# Patient Record
Sex: FEMALE | Race: BLACK OR AFRICAN AMERICAN | NOT HISPANIC OR LATINO | Employment: OTHER | URBAN - NONMETROPOLITAN AREA
[De-identification: names, ages, dates, MRNs, and addresses within clinical notes are randomized per-mention and may not be internally consistent; named-entity substitution may affect disease eponyms.]

---

## 2017-04-04 ENCOUNTER — OFFICE VISIT (OUTPATIENT)
Dept: SURGERY | Facility: CLINIC | Age: 72
End: 2017-04-04

## 2017-04-04 VITALS
DIASTOLIC BLOOD PRESSURE: 80 MMHG | BODY MASS INDEX: 28.86 KG/M2 | WEIGHT: 147 LBS | HEIGHT: 60 IN | SYSTOLIC BLOOD PRESSURE: 140 MMHG

## 2017-04-04 DIAGNOSIS — Z85.3 HX OF BREAST CANCER: Primary | ICD-10-CM

## 2017-04-04 PROCEDURE — 99214 OFFICE O/P EST MOD 30 MIN: CPT | Performed by: SURGERY

## 2017-04-09 NOTE — PROGRESS NOTES
Chief Complaint   Patient presents with   • Follow-up     Routine check on breast and mammograms.        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  She has subsequently completed radiation therapy to the LEFT breast in [place]..   She is also S/P RIGHT modified radical mastectomy for carcinoma in 1992 with postoperative chemotherapy. She had 6 of 47 positive lymph nodes, and was, therefore, staged a T2, N1, M0.    She has noted no new nodularity along any suture line, and no skin dimpling or nipple discharge on the LEFT side, and no axillary adenopathy on either side. Pain scale: 0 /10, Ambulation normal, Eating, Diet regular      [facility] [place]     Patient Name: EMMA MAJANO     ORDERING: IVANIA GALAN     ATTENDING: ADAL MONTANA MD FACS     REFERRING: IVANIA GALAN     -----------------------        PROCEDURE: Digital diagnostic mammogram of the left breast.     HISTORY: Left breast cancer, post lobectomy follow-up.     COMPARISON: Prior exams dating back to 11/5/2012, the most recent  dated 3/22/2016     NOTE:   Computer-aided detection was utilized during this exam.   Digital breast tomosynthesis was performed.     FINDINGS: CC, MLO, and spot compression cc views of the left  breast were obtained. There is architectural distortion and  nipple retraction related to previous lumpectomy.  Benign-appearing calcifications are noted in the outer upper left  breast near the skin. No suspicious mammographic findings are  seen.     IMPRESSION:  CONCLUSION:   1. RECOMMENDATIONS: Annual screening mammography unless clinical  circumstances dictate earlier evaluation     2. BI-RADS category 2, benign findings     Electronically signed by: James Cabrera MD 3/29/2017 9:39 AM CDT  Workstation: [id number]    ( I have personally reviewed the breast imaging and concur with the findings of the  radiologist- BiRADS 2)    Past Medical History:   Diagnosis Date   • Abnormal findings on diagnostic imaging of breast      LEFT side BIRADS Category 4 mammogram and ultrasound      • Benign lipomatous neoplasm of other sites    • Diverticular disease of colon    • History of colon polyps    • Hyperlipidemia    • Hypertensive disorder    • Intraductal carcinoma in situ of breast     L breast   • Malignant neoplasm of breast      T2N1M0 right side- 21 years S/P mastectomy          Past Surgical History:   Procedure Laterality Date   • BREAST BIOPSY  12/08/2014    Biopsy of the left breast with placement of a breast localization clip performed percutaneously using ultrasound guidance   • BREAST LUMPECTOMY     • BREAST SURGERY  12/18/2014    Ultrasound localization of left breast cance. Left lumpectomy   • COLONOSCOPY  07/19/2012    Mild diverticulosis (562.10) found in the sigmoid colon. A single polyp was found in the transverse colon(211.3); removed by cold biopsy polypectomy. Hemorrhoids found(455.6)   • MASTECTOMY  1992    modified radical         Current Outpatient Prescriptions:   •  amLODIPine (NORVASC) 5 MG tablet, Take 5 mg by mouth Daily., Disp: , Rfl:   •  anastrozole (ARIMIDEX) 1 MG tablet, Take 1 mg by mouth Daily., Disp: , Rfl:   •  aspirin 81 MG chewable tablet, Chew 81 mg Daily., Disp: , Rfl:   •  lisinopril-hydrochlorothiazide (PRINZIDE,ZESTORETIC) 10-12.5 MG per tablet, Take 1 tablet by mouth Daily., Disp: , Rfl:   •  metFORMIN (GLUCOPHAGE) 500 MG tablet, Take 500 mg by mouth Every Night., Disp: , Rfl:   •  simvastatin (ZOCOR) 20 MG tablet, Take 20 mg by mouth Every Night., Disp: , Rfl:     No Known Allergies    Family History   Problem Relation Age of Onset   • Family history unknown: Yes       Social History     Social History   • Marital status:      Spouse name: N/A   • Number of children: N/A   • Years of education: N/A     Occupational History   • Not on file.     Social History Main  Topics   • Smoking status: Never Smoker   • Smokeless tobacco: Not on file   • Alcohol use No   • Drug use: Not on file   • Sexual activity: Not on file     Other Topics Concern   • Not on file     Social History Narrative       Review of Systems   Constitutional: Negative for appetite change, chills, fever and unexpected weight change.   HENT: Negative for hearing loss, nosebleeds and trouble swallowing.    Eyes: Negative for visual disturbance.   Respiratory: Negative for apnea, cough, choking, chest tightness, shortness of breath, wheezing and stridor.    Cardiovascular: Negative for chest pain, palpitations and leg swelling.   Gastrointestinal: Negative for abdominal distention, abdominal pain, blood in stool, constipation, diarrhea, nausea and vomiting.   Endocrine: Negative for cold intolerance, heat intolerance, polydipsia, polyphagia and polyuria.   Genitourinary: Negative for difficulty urinating, dysuria, frequency, hematuria and urgency.   Musculoskeletal: Negative for arthralgias, back pain, myalgias and neck pain.   Skin: Negative for color change, pallor and rash.   Allergic/Immunologic: Negative for immunocompromised state.   Neurological: Negative for dizziness, seizures, syncope, light-headedness, numbness and headaches.   Hematological: Negative for adenopathy.   Psychiatric/Behavioral: Negative for suicidal ideas. The patient is not nervous/anxious.        Physical Exam   Constitutional: She appears well-developed and well-nourished. No distress.   HENT:   Head: Normocephalic and atraumatic.   Eyes: Conjunctivae and EOM are normal. Pupils are equal, round, and reactive to light.   Neck: Normal range of motion. Neck supple. No JVD present. No tracheal deviation present. No thyromegaly present.   Pulmonary/Chest: Effort normal and breath sounds normal. No stridor. No respiratory distress. She has no wheezes. She has no rales. She exhibits no tenderness. Left breast exhibits skin change (Minimal  radiation changes). Left breast exhibits no inverted nipple, no mass, no nipple discharge and no tenderness. There is no breast swelling.       Genitourinary: No breast discharge or bleeding.   Lymphadenopathy:     She has no cervical adenopathy.   Skin: She is not diaphoretic.   Vitals reviewed.        ASSESSMENT    Yaritza was seen today for follow-up.    Diagnoses and all orders for this visit:    Hx of breast cancer  Comments:  T2N1M0 RIGHT side 1992  [id number] LEFT side 2014      PLAN    1. Recheck in 1 year with mammograms and examination          This document has been electronically signed by Nathanael Ross MD on April 9, 2017 11:26 AM

## 2017-06-14 ENCOUNTER — OFFICE VISIT (OUTPATIENT)
Dept: GASTROENTEROLOGY | Facility: CLINIC | Age: 72
End: 2017-06-14

## 2017-06-14 VITALS
WEIGHT: 143.8 LBS | HEIGHT: 60 IN | BODY MASS INDEX: 28.23 KG/M2 | HEART RATE: 86 BPM | DIASTOLIC BLOOD PRESSURE: 89 MMHG | SYSTOLIC BLOOD PRESSURE: 148 MMHG

## 2017-06-14 DIAGNOSIS — K63.5 COLON POLYPS: Primary | ICD-10-CM

## 2017-06-14 PROCEDURE — PTNOCHG PR CUSTOM PT NO CHARGE VISIT: Performed by: INTERNAL MEDICINE

## 2017-06-14 RX ORDER — SODIUM CHLORIDE 0.9 % (FLUSH) 0.9 %
1-10 SYRINGE (ML) INJECTION AS NEEDED
Status: CANCELLED | OUTPATIENT
Start: 2017-06-14

## 2017-06-14 RX ORDER — DEXTROSE AND SODIUM CHLORIDE 5; .45 G/100ML; G/100ML
30 INJECTION, SOLUTION INTRAVENOUS CONTINUOUS PRN
Status: CANCELLED | OUTPATIENT
Start: 2017-06-14

## 2017-06-14 RX ORDER — LIDOCAINE HYDROCHLORIDE 10 MG/ML
0.1 INJECTION, SOLUTION EPIDURAL; INFILTRATION; INTRACAUDAL; PERINEURAL ONCE AS NEEDED
Status: CANCELLED | OUTPATIENT
Start: 2017-06-14

## 2017-06-18 NOTE — PROGRESS NOTES
Chief Complaint   Patient presents with   • Colonoscopy Consultation     Previous Colonoscopy Performed 07/19/2012        Yaritza De La O is a  71 y.o. female here for a follow up visit To arrange follow-up colonoscopy because of a history of colonic polyps.    HPI :  The patient was last seen here in 2012.  At that time repeat colonoscopy was advised in 5 years because of a history of colonic polyps.  Bowel movements are regular with no recent change in bowel habits.  She denies rectal bleeding or melena.  Family history is negative for carcinoma the colon.      Past Medical History:   Diagnosis Date   • Abnormal findings on diagnostic imaging of breast      LEFT side BIRADS Category 4 mammogram and ultrasound      • Benign lipomatous neoplasm of other sites    • Diverticular disease of colon    • History of colon polyps    • Hyperlipidemia    • Hypertensive disorder    • Intraductal carcinoma in situ of breast     L breast   • Malignant neoplasm of breast      T2N1M0 right side- 21 years S/P mastectomy          Current Outpatient Prescriptions   Medication Sig Dispense Refill   • amLODIPine (NORVASC) 5 MG tablet Take 5 mg by mouth Daily.     • anastrozole (ARIMIDEX) 1 MG tablet Take 1 mg by mouth Daily.     • aspirin 81 MG chewable tablet Chew 81 mg Daily.     • lisinopril-hydrochlorothiazide (PRINZIDE,ZESTORETIC) 10-12.5 MG per tablet Take 1 tablet by mouth Daily.     • metFORMIN (GLUCOPHAGE) 500 MG tablet Take 500 mg by mouth Every Night.     • simvastatin (ZOCOR) 20 MG tablet Take 20 mg by mouth Every Night.       No current facility-administered medications for this visit.        PRN Meds:.    No Known Allergies    Social History     Social History   • Marital status:      Spouse name: N/A   • Number of children: N/A   • Years of education: N/A     Occupational History   • Not on file.     Social History Main Topics   • Smoking status: Never Smoker   • Smokeless tobacco: Never Used   • Alcohol use No       Comment: Glass Of Wine At [place] And/Or New Years   • Drug use: No   • Sexual activity: Defer     Other Topics Concern   • Not on file     Social History Narrative       Family History   Problem Relation Age of Onset   • Family history unknown: Yes       Review of Systems   Constitutional: Negative for activity change, appetite change, chills, diaphoresis, fatigue, fever and unexpected weight change.   Gastrointestinal: Negative for abdominal distention, abdominal pain, anal bleeding, blood in stool, constipation, diarrhea, nausea, rectal pain and vomiting.   Psychiatric/Behavioral: Negative for confusion.       Vitals:    06/14/17 1121   BP: 148/89   Pulse: 86       Physical Exam   Constitutional: Vital signs are normal. She appears well-developed and well-nourished.  Non-toxic appearance. She does not have a sickly appearance. She does not appear ill. No distress.   Cardiovascular: Regular rhythm, S1 normal, S2 normal and normal heart sounds.   No extrasystoles are present. PMI is not displaced.    No murmur heard.  Pulmonary/Chest: Breath sounds normal. No tachypnea. No respiratory distress.   Abdominal: Soft. Normal appearance and bowel sounds are normal. She exhibits no shifting dullness, no distension, no fluid wave, no ascites and no mass. There is no hepatosplenomegaly, splenomegaly or hepatomegaly. There is no tenderness. No hernia.   Neurological: She is alert.   Nursing note and vitals reviewed.      ASSESSMENT AND PLAN      ICD-10-CM ICD-9-CM   1. Colon polyps K63.5 211.3      Plan :  Surveillance colonoscopy         This document has been electronically signed by Christopher Rodriguez MD on June 18, 2017 4:04 PM                                   “EMR Dragon/Transcription disclaimer:   Much of this encounter note is an electronic transcription/translation of spoken language to printed text. The electronic translation of spoken language may permit erroneous, or at times, nonsensical words or phrases  to be inadvertently transcribed; Although I have reviewed the note for such errors, some may still exist.”

## 2017-06-22 ENCOUNTER — HOSPITAL ENCOUNTER (OUTPATIENT)
Facility: HOSPITAL | Age: 72
Setting detail: HOSPITAL OUTPATIENT SURGERY
Discharge: HOME | End: 2017-06-22
Attending: INTERNAL MEDICINE | Admitting: INTERNAL MEDICINE

## 2017-06-22 ENCOUNTER — ANESTHESIA (OUTPATIENT)
Dept: GASTROENTEROLOGY | Facility: HOSPITAL | Age: 72
End: 2017-06-22

## 2017-06-22 ENCOUNTER — ANESTHESIA EVENT (OUTPATIENT)
Dept: GASTROENTEROLOGY | Facility: HOSPITAL | Age: 72
End: 2017-06-22

## 2017-06-22 VITALS
TEMPERATURE: 97.6 F | HEART RATE: 90 BPM | DIASTOLIC BLOOD PRESSURE: 73 MMHG | BODY MASS INDEX: 27.57 KG/M2 | WEIGHT: 140.43 LBS | RESPIRATION RATE: 18 BRPM | OXYGEN SATURATION: 96 % | HEIGHT: 60 IN | SYSTOLIC BLOOD PRESSURE: 127 MMHG

## 2017-06-22 DIAGNOSIS — K63.5 COLON POLYPS: ICD-10-CM

## 2017-06-22 LAB — GLUCOSE BLDC GLUCOMTR-MCNC: 116 MG/DL (ref 70–130)

## 2017-06-22 PROCEDURE — 45385 COLONOSCOPY W/LESION REMOVAL: CPT | Performed by: INTERNAL MEDICINE

## 2017-06-22 PROCEDURE — 25010000002 PROPOFOL 10 MG/ML EMULSION: Performed by: NURSE ANESTHETIST, CERTIFIED REGISTERED

## 2017-06-22 PROCEDURE — 88305 TISSUE EXAM BY PATHOLOGIST: CPT | Performed by: PATHOLOGY

## 2017-06-22 PROCEDURE — 82962 GLUCOSE BLOOD TEST: CPT

## 2017-06-22 PROCEDURE — 45380 COLONOSCOPY AND BIOPSY: CPT | Performed by: INTERNAL MEDICINE

## 2017-06-22 PROCEDURE — 88305 TISSUE EXAM BY PATHOLOGIST: CPT | Performed by: INTERNAL MEDICINE

## 2017-06-22 RX ORDER — PROPOFOL 10 MG/ML
VIAL (ML) INTRAVENOUS AS NEEDED
Status: DISCONTINUED | OUTPATIENT
Start: 2017-06-22 | End: 2017-06-22 | Stop reason: SURG

## 2017-06-22 RX ORDER — LIDOCAINE HYDROCHLORIDE 10 MG/ML
INJECTION, SOLUTION INFILTRATION; PERINEURAL AS NEEDED
Status: DISCONTINUED | OUTPATIENT
Start: 2017-06-22 | End: 2017-06-22 | Stop reason: SURG

## 2017-06-22 RX ORDER — DEXTROSE AND SODIUM CHLORIDE 5; .45 G/100ML; G/100ML
30 INJECTION, SOLUTION INTRAVENOUS CONTINUOUS PRN
Status: DISCONTINUED | OUTPATIENT
Start: 2017-06-22 | End: 2017-06-22 | Stop reason: HOSPADM

## 2017-06-22 RX ADMIN — DEXTROSE AND SODIUM CHLORIDE 30 ML/HR: 5; 450 INJECTION, SOLUTION INTRAVENOUS at 14:52

## 2017-06-22 RX ADMIN — PROPOFOL 30 MG: 10 INJECTION, EMULSION INTRAVENOUS at 15:40

## 2017-06-22 RX ADMIN — PROPOFOL 80 MG: 10 INJECTION, EMULSION INTRAVENOUS at 15:29

## 2017-06-22 RX ADMIN — PROPOFOL 50 MG: 10 INJECTION, EMULSION INTRAVENOUS at 15:32

## 2017-06-22 RX ADMIN — LIDOCAINE HYDROCHLORIDE 40 MG: 10 INJECTION, SOLUTION INFILTRATION; PERINEURAL at 15:29

## 2017-06-22 RX ADMIN — PROPOFOL 50 MG: 10 INJECTION, EMULSION INTRAVENOUS at 15:30

## 2017-06-22 RX ADMIN — PROPOFOL 50 MG: 10 INJECTION, EMULSION INTRAVENOUS at 15:35

## 2017-06-22 NOTE — H&P (VIEW-ONLY)
Chief Complaint   Patient presents with   • Colonoscopy Consultation     Previous Colonoscopy Performed 07/19/2012        Yaritza De La O is a  71 y.o. female here for a follow up visit To arrange follow-up colonoscopy because of a history of colonic polyps.    HPI :  The patient was last seen here in 2012.  At that time repeat colonoscopy was advised in 5 years because of a history of colonic polyps.  Bowel movements are regular with no recent change in bowel habits.  She denies rectal bleeding or melena.  Family history is negative for carcinoma the colon.      Past Medical History:   Diagnosis Date   • Abnormal findings on diagnostic imaging of breast      LEFT side BIRADS Category 4 mammogram and ultrasound      • Benign lipomatous neoplasm of other sites    • Diverticular disease of colon    • History of colon polyps    • Hyperlipidemia    • Hypertensive disorder    • Intraductal carcinoma in situ of breast     L breast   • Malignant neoplasm of breast      T2N1M0 right side- 21 years S/P mastectomy          Current Outpatient Prescriptions   Medication Sig Dispense Refill   • amLODIPine (NORVASC) 5 MG tablet Take 5 mg by mouth Daily.     • anastrozole (ARIMIDEX) 1 MG tablet Take 1 mg by mouth Daily.     • aspirin 81 MG chewable tablet Chew 81 mg Daily.     • lisinopril-hydrochlorothiazide (PRINZIDE,ZESTORETIC) 10-12.5 MG per tablet Take 1 tablet by mouth Daily.     • metFORMIN (GLUCOPHAGE) 500 MG tablet Take 500 mg by mouth Every Night.     • simvastatin (ZOCOR) 20 MG tablet Take 20 mg by mouth Every Night.       No current facility-administered medications for this visit.        PRN Meds:.    No Known Allergies    Social History     Social History   • Marital status:      Spouse name: N/A   • Number of children: N/A   • Years of education: N/A     Occupational History   • Not on file.     Social History Main Topics   • Smoking status: Never Smoker   • Smokeless tobacco: Never Used   • Alcohol use No       Comment: Glass Of Wine At [place] And/Or New Years   • Drug use: No   • Sexual activity: Defer     Other Topics Concern   • Not on file     Social History Narrative       Family History   Problem Relation Age of Onset   • Family history unknown: Yes       Review of Systems   Constitutional: Negative for activity change, appetite change, chills, diaphoresis, fatigue, fever and unexpected weight change.   Gastrointestinal: Negative for abdominal distention, abdominal pain, anal bleeding, blood in stool, constipation, diarrhea, nausea, rectal pain and vomiting.   Psychiatric/Behavioral: Negative for confusion.       Vitals:    06/14/17 1121   BP: 148/89   Pulse: 86       Physical Exam   Constitutional: Vital signs are normal. She appears well-developed and well-nourished.  Non-toxic appearance. She does not have a sickly appearance. She does not appear ill. No distress.   Cardiovascular: Regular rhythm, S1 normal, S2 normal and normal heart sounds.   No extrasystoles are present. PMI is not displaced.    No murmur heard.  Pulmonary/Chest: Breath sounds normal. No tachypnea. No respiratory distress.   Abdominal: Soft. Normal appearance and bowel sounds are normal. She exhibits no shifting dullness, no distension, no fluid wave, no ascites and no mass. There is no hepatosplenomegaly, splenomegaly or hepatomegaly. There is no tenderness. No hernia.   Neurological: She is alert.   Nursing note and vitals reviewed.      ASSESSMENT AND PLAN      ICD-10-CM ICD-9-CM   1. Colon polyps K63.5 211.3      Plan :  Surveillance colonoscopy         This document has been electronically signed by Christopher Rodriguez MD on June 18, 2017 4:04 PM                                   “EMR Dragon/Transcription disclaimer:   Much of this encounter note is an electronic transcription/translation of spoken language to printed text. The electronic translation of spoken language may permit erroneous, or at times, nonsensical words or phrases  to be inadvertently transcribed; Although I have reviewed the note for such errors, some may still exist.”

## 2017-06-22 NOTE — PLAN OF CARE
Problem: Patient Care Overview (Adult)  Goal: Plan of Care Review    06/22/17 1541   Coping/Psychosocial Response Interventions   Plan Of Care Reviewed With patient   Patient Care Overview   Progress no change   Outcome Evaluation   Outcome Summary/Follow up Plan vss         Problem: GI Endoscopy (Adult)  Goal: Signs and Symptoms of Listed Potential Problems Will be Absent or Manageable (GI Endoscopy)    06/22/17 1541   GI Endoscopy   Problems Assessed (GI Endoscopy) all   Problems Present (GI Endoscopy) none

## 2017-06-22 NOTE — PLAN OF CARE
Problem: Patient Care Overview (Adult)  Goal: Plan of Care Review    06/22/17 1607   Coping/Psychosocial Response Interventions   Plan Of Care Reviewed With patient   Patient Care Overview   Progress no change         Problem: GI Endoscopy (Adult)  Goal: Signs and Symptoms of Listed Potential Problems Will be Absent or Manageable (GI Endoscopy)    06/22/17 1607   GI Endoscopy   Problems Assessed (GI Endoscopy) all   Problems Present (GI Endoscopy) none

## 2017-06-22 NOTE — ANESTHESIA PREPROCEDURE EVALUATION
Anesthesia Evaluation     Patient summary reviewed and Nursing notes reviewed   NPO Solid Status: > 2 hours  NPO Liquid Status: > 2 hours     Airway   Mallampati: II  TM distance: >3 FB  Neck ROM: full  no difficulty expected  Dental - normal exam     Pulmonary - negative pulmonary ROS and normal exam   Cardiovascular     Rhythm: regular  Rate: normal        Neuro/Psych- negative ROS  GI/Hepatic/Renal/Endo      Musculoskeletal (-) negative ROS    Abdominal    Substance History      OB/GYN          Other                                      Anesthesia Plan    ASA 3     MAC     intravenous induction   Anesthetic plan and risks discussed with patient.    Plan discussed with CRNA.

## 2017-06-22 NOTE — INTERVAL H&P NOTE
H&P reviewed. The patient was examined and there are no changes to the H&P.  I have discussed risks and benefits of the proposed procedure. Patient wishes to proceed.

## 2017-06-22 NOTE — ANESTHESIA POSTPROCEDURE EVALUATION
Patient: Yaritza De La O    Procedure Summary     Date Anesthesia Start Anesthesia Stop Room / Location    06/22/17 1526 1549 [facility] ENDOSCOPY 1 / [facility] ENDOSCOPY       Procedure Diagnosis Surgeon Provider    COLONOSCOPY (N/A ) Colon polyps  (Colon polyps [K63.5]) MD Ubaldo Yusuf CRNA          Anesthesia Type: MAC  Last vitals  BP      Temp      Pulse     Resp      SpO2        Post Anesthesia Care and Evaluation    Patient location during evaluation: bedside  Patient participation: complete - patient participated  Level of consciousness: awake and alert  Pain score: 0  Pain management: adequate  Airway patency: patent  Anesthetic complications: No anesthetic complications  PONV Status: none  Cardiovascular status: acceptable  Respiratory status: acceptable  Hydration status: acceptable

## 2017-06-26 LAB
LAB AP CASE REPORT: NORMAL
Lab: NORMAL
PATH REPORT.FINAL DX SPEC: NORMAL
PATH REPORT.GROSS SPEC: NORMAL

## 2018-04-06 ENCOUNTER — OFFICE VISIT (OUTPATIENT)
Dept: SURGERY | Facility: CLINIC | Age: 73
End: 2018-04-06

## 2018-04-06 VITALS
HEIGHT: 60 IN | SYSTOLIC BLOOD PRESSURE: 122 MMHG | BODY MASS INDEX: 27.61 KG/M2 | DIASTOLIC BLOOD PRESSURE: 78 MMHG | WEIGHT: 140.6 LBS

## 2018-04-06 DIAGNOSIS — Z85.3 HISTORY OF BILATERAL BREAST CANCER: Primary | ICD-10-CM

## 2018-04-06 PROCEDURE — 99213 OFFICE O/P EST LOW 20 MIN: CPT | Performed by: SURGERY

## 2018-04-06 NOTE — PROGRESS NOTES
Chief Complaint   Patient presents with   • Follow-up     Recheck Breast and Mammogram.        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  She has subsequently completed radiation therapy to the LEFT breast in [place]..   She is also S/P RIGHT modified radical mastectomy for carcinoma in 1992 with postoperative chemotherapy. She had 6 of 47 positive lymph nodes, and was, therefore, staged a T2, N1, M0.    She has noted no new nodularity along any suture line, and no skin dimpling or nipple discharge on the LEFT side, and no axillary adenopathy on either side. Pain scale: 0 /10, Ambulation normal, Eating, Diet regular.  Most recent mammography demonstrates the following:  Study Result        PROCEDURE: Left unilateral screening mammogram with 3-D  tomosynthesis with CAD     REASON FOR EXAM: Screening mammography. History of right  mastectomy secondary to breast malignancy.     FINDINGS: Comparison study dated 3/29/2017, 3/22/2016, 9/15/2015,  11/18/2014.No interval change in appearance the breast  parenchyma. No suspicious mass or malignant type  microcalcifications . No skin thickening or retraction. Clip  marks site of prior left breast biopsy. Stable benign left breast  calcification.     Parenchymal pattern: Scattered fibroglandular densities     IMPRESSION:  No mammographic evidence of malignancy.     BI-RADS category 2: Benign findings.     Recommendation: Annual mammography      Electronically signed by:  Victor M Arriaga MD  4/2/2018 11:50 AM CDT  Workstation: [id number]        ( I have personally reviewed the breast imaging and concur with the findings of the radiologist- BiRADS 2)  Past Medical History:   Diagnosis Date   • Abnormal findings on diagnostic imaging of breast      LEFT side BIRADS Category 4 mammogram and ultrasound      • Benign lipomatous neoplasm of other sites    • Diabetes mellitus    •  Diverticular disease of colon    • History of colon polyps    • Hyperlipidemia    • Hypertensive disorder    • Intraductal carcinoma in situ of breast     L breast   • Malignant neoplasm of breast      T2N1M0 right side- 21 years S/P mastectomy          Past Surgical History:   Procedure Laterality Date   • BREAST BIOPSY  12/08/2014    Biopsy of the left breast with placement of a breast localization clip performed percutaneously using ultrasound guidance   • BREAST LUMPECTOMY     • BREAST SURGERY  12/18/2014    Ultrasound localization of left breast cance. Left lumpectomy   • COLONOSCOPY  07/19/2012    Mild diverticulosis (562.10) found in the sigmoid colon. A single polyp was found in the transverse colon(211.3); removed by cold biopsy polypectomy. Hemorrhoids found(455.6)   • COLONOSCOPY N/A 6/22/2017    Procedure: COLONOSCOPY;  Surgeon: Christopher Rodriguez MD;  Location: [facility] ENDOSCOPY;  Service:    • MASTECTOMY  1992    modified radical         Current Outpatient Prescriptions:   •  amLODIPine (NORVASC) 5 MG tablet, Take 5 mg by mouth Daily., Disp: , Rfl:   •  anastrozole (ARIMIDEX) 1 MG tablet, Take 1 mg by mouth Daily., Disp: , Rfl:   •  aspirin 81 MG chewable tablet, Chew 81 mg Daily., Disp: , Rfl:   •  lisinopril-hydrochlorothiazide (PRINZIDE,ZESTORETIC) 10-12.5 MG per tablet, Take 1 tablet by mouth Daily., Disp: , Rfl:   •  metFORMIN (GLUCOPHAGE) 500 MG tablet, Take 500 mg by mouth Every Night., Disp: , Rfl:   •  Multiple Vitamins-Minerals (MULTIVITAMIN PO), Take 1 tablet by mouth Daily., Disp: , Rfl:   •  simvastatin (ZOCOR) 20 MG tablet, Take 20 mg by mouth Every Night., Disp: , Rfl:     No Known Allergies    Family History   Problem Relation Age of Onset   • Breast cancer Sister        Social History     Social History   • Marital status:      Spouse name: N/A   • Number of children: N/A   • Years of education: N/A     Occupational History   • Not on file.     Social History Main Topics   •  Smoking status: Never Smoker   • Smokeless tobacco: Never Used   • Alcohol use No      Comment: Glass Of Wine At [place] And/Or New Years   • Drug use: No   • Sexual activity: Defer     Other Topics Concern   • Not on file     Social History Narrative   • No narrative on file       Review of Systems   Constitutional: Negative for appetite change, chills, fever and unexpected weight change.   HENT: Negative for hearing loss, nosebleeds and trouble swallowing.    Eyes: Negative for visual disturbance.   Respiratory: Negative for apnea, cough, choking, chest tightness, shortness of breath, wheezing and stridor.    Cardiovascular: Negative for chest pain, palpitations and leg swelling.   Gastrointestinal: Negative for abdominal distention, abdominal pain, blood in stool, constipation, diarrhea, nausea and vomiting.   Endocrine: Negative for cold intolerance, heat intolerance, polydipsia, polyphagia and polyuria.   Genitourinary: Negative for difficulty urinating, dysuria, frequency, hematuria and urgency.   Musculoskeletal: Negative for arthralgias, back pain, myalgias and neck pain.   Skin: Negative for color change, pallor and rash.   Allergic/Immunologic: Negative for immunocompromised state.   Neurological: Negative for dizziness, seizures, syncope, light-headedness, numbness and headaches.   Hematological: Negative for adenopathy.   Psychiatric/Behavioral: Negative for suicidal ideas. The patient is not nervous/anxious.        Physical Exam      Constitutional: She appears well-developed and well-nourished. No distress.   HENT:   Head: Normocephalic and atraumatic.   Eyes: Conjunctivae and EOM are normal. Pupils are equal, round, and reactive to light.   Neck: Normal range of motion. Neck supple. No JVD present. No tracheal deviation present. No thyromegaly present.   Pulmonary/Chest: Effort normal and breath sounds normal. No stridor. No respiratory distress. She has no wheezes. She has no rales. She exhibits no  tenderness. Left breast exhibits skin change (Minimal radiation changes). Left breast exhibits no inverted nipple, no mass, no nipple discharge and no tenderness. There is no breast swelling.       Genitourinary: No breast discharge or bleeding.   Lymphadenopathy: She has no cervical adenopathy and no supraclavicular adenopathy.   Skin: She is not diaphoretic.   Vitals reviewed.  ASSESSMENT    Yaritza was seen today for follow-up.    Diagnoses and all orders for this visit:    History of bilateral breast cancer        PLAN    1.  Recheck in 1 year with mammogram left side and examination at that time          This document has been electronically signed by Nathanael Ross MD on April 8, 2018 4:38 PM

## 2018-04-06 NOTE — PATIENT INSTRUCTIONS

## 2018-04-11 DIAGNOSIS — Z12.31 SCREENING MAMMOGRAM, ENCOUNTER FOR: Primary | ICD-10-CM

## 2019-04-08 ENCOUNTER — OFFICE VISIT (OUTPATIENT)
Dept: SURGERY | Facility: CLINIC | Age: 74
End: 2019-04-08

## 2019-04-08 VITALS
HEIGHT: 60 IN | WEIGHT: 139 LBS | DIASTOLIC BLOOD PRESSURE: 80 MMHG | TEMPERATURE: 98 F | BODY MASS INDEX: 27.29 KG/M2 | SYSTOLIC BLOOD PRESSURE: 120 MMHG | HEART RATE: 83 BPM

## 2019-04-08 DIAGNOSIS — Z85.3 HX: BREAST CANCER: Primary | ICD-10-CM

## 2019-04-08 PROCEDURE — 99213 OFFICE O/P EST LOW 20 MIN: CPT | Performed by: SURGERY

## 2019-04-08 NOTE — PATIENT INSTRUCTIONS

## 2019-04-11 PROBLEM — Z85.3 HX: BREAST CANCER: Status: ACTIVE | Noted: 2019-04-11

## 2019-04-12 NOTE — PROGRESS NOTES
Chief Complaint: This is a 73 y.o. woman seen in consultation for routine followup breast cancer    History of Present Illness:  Patient has noted no new masses, skin changes, nipple discharge, nipple changes prior to her most recent imaging.  Her most recent imaging includes the following:   Study Result     PROCEDURE: Unilateral left digital screening mammogram      HISTORY: Routine screening mammography. Patient has undergone  right mastectomy for breast cancer.     COMPARISON: Prior exams dated 3/30/2018 through 11/13/2014     NOTE: Computer-aided detection was utilized during this exam.   Digital breast tomosynthesis was performed.     FINDINGS:   CC and MLO views are obtained of the left breast.      Parenchymal pattern: Heterogeneous     Stable benign-appearing calcifications and postoperative changes  are noted.  No suspicious mass, architectural distortion or  suspicious microcalcifications.     IMPRESSION:  CONCLUSION:    No mammographic evidence of malignancy.  In the absence of  suspicious clinical or physical findings, routine follow-up  mammography is recommended in one year.     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  She has subsequently completed radiation therapy to the LEFT breast in [place]..   She is also S/P RIGHT modified radical mastectomy for carcinoma in 1992 with postoperative chemotherapy. She had 6 of 47 positive lymph nodes, and was, therefore, staged a T2, N1, M0.    She has noted no new nodularity along any suture line, and no skin dimpling or nipple discharge on the LEFT side, and no axillary adenopathy on either side.  Pain scale: 0 /10, Ambulation normal, Eating, Diet regular.  Her family history includes the following: Sister with breast cancer    She is here for evaluation.    Past Medical History:   Diagnosis Date   • Abnormal findings on diagnostic imaging of breast      LEFT side BIRADS Category 4 mammogram and ultrasound      • Benign lipomatous neoplasm of other sites    • Diabetes mellitus (CMS/HCC)    • Diverticular disease of colon    • Drug therapy    • History of colon polyps    • Hx of radiation therapy    • Hyperlipidemia    • Hypertensive disorder    • Intraductal carcinoma in situ of breast     L breast   • Malignant neoplasm of breast (CMS/HCC)      T2N1M0 right side- 21 years S/P mastectomy        Past Surgical History:   Procedure Laterality Date   • BREAST BIOPSY  12/08/2014    Biopsy of the left breast with placement of a breast localization clip performed percutaneously using ultrasound guidance   • BREAST LUMPECTOMY     • BREAST SURGERY  12/18/2014    Ultrasound localization of left breast cance. Left lumpectomy   • COLONOSCOPY  07/19/2012    Mild diverticulosis (562.10) found in the sigmoid colon. A single polyp was found in the transverse colon(211.3); removed by cold biopsy polypectomy. Hemorrhoids found(455.6)   • COLONOSCOPY N/A 6/22/2017    Procedure: COLONOSCOPY;  Surgeon: Christopher Rodriguez MD;  Location: [facility] [place] ENDOSCOPY;  Service:    • MASTECTOMY  1992    modified radical     Prior to Admission medications    Medication Sig Start Date End Date Taking? Authorizing Provider   amLODIPine (NORVASC) 5 MG tablet Take 5 mg by mouth Daily.   Yes ProviderCarolin MD   anastrozole (ARIMIDEX) 1 MG tablet Take 1 mg by mouth Daily.   Yes ProviderCarolin MD   aspirin 81 MG chewable tablet Chew 81 mg Daily.   Yes ProviderCarolin MD   lisinopril-hydrochlorothiazide (PRINZIDE,ZESTORETIC) 10-12.5 MG per tablet Take 1 tablet by mouth Daily.   Yes Carolin Negron MD   metFORMIN (GLUCOPHAGE)  500 MG tablet Take 500 mg by mouth Every Night.   Yes ProviderCarolin MD   Multiple Vitamins-Minerals (MULTIVITAMIN PO) Take 1 tablet by mouth Daily.   Yes Carolin Negron MD   simvastatin (ZOCOR) 20 MG tablet Take 20 mg by mouth Every Night.   Yes Carolin Negron MD     No Known Allergies  Family History   Problem Relation Age of Onset   • Breast cancer Sister      Social History     Socioeconomic History   • Marital status:      Spouse name: Not on file   • Number of children: Not on file   • Years of education: Not on file   • Highest education level: Not on file   Tobacco Use   • Smoking status: Never Smoker   • Smokeless tobacco: Never Used   Substance and Sexual Activity   • Alcohol use: No     Comment: Glass Of Wine At [place] And/Or New Years   • Drug use: No   • Sexual activity: Defer     Review of Systems   Constitutional: Negative for appetite change, chills, fever and unexpected weight change.   HENT: Negative for hearing loss, nosebleeds and trouble swallowing.    Eyes: Negative for visual disturbance.   Respiratory: Negative for apnea, cough, choking, chest tightness, shortness of breath, wheezing and stridor.    Cardiovascular: Negative for chest pain, palpitations and leg swelling.   Gastrointestinal: Negative for abdominal distention, abdominal pain, blood in stool, constipation, diarrhea, nausea and vomiting.   Endocrine: Negative for cold intolerance, heat intolerance, polydipsia, polyphagia and polyuria.   Genitourinary: Negative for difficulty urinating, dysuria, frequency, hematuria and urgency.   Musculoskeletal: Negative for arthralgias, back pain, myalgias and neck pain.   Skin: Negative for color change, pallor and rash.   Allergic/Immunologic: Negative for immunocompromised state.   Neurological: Negative for dizziness, seizures, syncope, light-headedness, numbness and headaches.   Hematological: Negative for adenopathy.   Psychiatric/Behavioral: Negative for  suicidal ideas. The patient is not nervous/anxious.      Physical Exam   Constitutional: She is oriented to person, place, and time. She appears well-developed and well-nourished. No distress.   HENT:   Head: Normocephalic and atraumatic.   Eyes: EOM are normal. Pupils are equal, round, and reactive to light.   Neck: Normal range of motion. Neck supple. No tracheal deviation present. No thyromegaly present.   Cardiovascular: Normal rate and regular rhythm.   Pulmonary/Chest: Effort normal and breath sounds normal. No stridor. No respiratory distress. She has no wheezes. She has no rales. She exhibits no tenderness. Left breast exhibits no inverted nipple, no mass, no nipple discharge, no skin change and no tenderness.       Abdominal: Soft. Normal appearance. There is no tenderness.   All incisions are healing well without infection or hernia.    Musculoskeletal: Normal range of motion. She exhibits no edema or deformity.   Neurological: She is alert and oriented to person, place, and time.   Skin: Skin is warm and dry. She is not diaphoretic. There is erythema. There is pallor.   Vitals reviewed.    Vitals:    04/08/19 0907   BP: 120/80   Pulse: 83   Temp: 98 °F (36.7 °C)     Assessment:    Yaritza was seen today for follow-up.    Diagnoses and all orders for this visit:    HX: breast cancer        Plan:  1. Recheck in 1 year with mammogram and examination                This document has been electronically signed by Nathanael Ross MD on April 11, 2019 10:27 PM

## 2020-04-27 DIAGNOSIS — Z12.31 SCREENING MAMMOGRAM, ENCOUNTER FOR: Primary | ICD-10-CM

## 2020-06-22 ENCOUNTER — OFFICE VISIT (OUTPATIENT)
Dept: SURGERY | Facility: CLINIC | Age: 75
End: 2020-06-22

## 2020-06-22 VITALS
WEIGHT: 134.6 LBS | DIASTOLIC BLOOD PRESSURE: 80 MMHG | BODY MASS INDEX: 26.42 KG/M2 | TEMPERATURE: 98.7 F | SYSTOLIC BLOOD PRESSURE: 128 MMHG | HEIGHT: 60 IN | OXYGEN SATURATION: 98 % | HEART RATE: 90 BPM

## 2020-06-22 DIAGNOSIS — Z85.3 HX: BREAST CANCER: Primary | ICD-10-CM

## 2020-06-22 PROCEDURE — 99212 OFFICE O/P EST SF 10 MIN: CPT | Performed by: SURGERY

## 2020-06-22 RX ORDER — ATORVASTATIN CALCIUM 40 MG/1
40 TABLET, FILM COATED ORAL NIGHTLY
COMMUNITY
Start: 2020-06-12

## 2020-06-22 NOTE — PROGRESS NOTES
Chief Complaint   Patient presents with   • Follow-up     Breast and Mammogram        HPI  Hx of breast cancer 28 years ago (invasive- mastectomy); and DCIS in 2006 on the left side- lumpectomy and RT. No new masses or changes.    Study Result     MAMMOGRAM: Screening left breast.     HISTORY: Screening mammogram.       Prior history right breast cancer, and mastectomy.     COMPARISON:  April 8, 2019.        FINDINGS:     Left breast low dose digital mammography was performed.    This study was interpreted with the assistance of CAD (computer  aided diagnosis. 3-D Mammotomosynthesis was obtained.     Parenchymal pattern: Heterogenous dense.        There is no evidence of a newly appearing discrete mass lesion,  area of architectural distortion, or abnormal microcalcifications  to suspect the presence of malignancy.  Scarring and nipple retraction left breast, unchanged since prior  exams. Benign mammographic exam.     IMPRESSION:  1.  No mammographic evidence of malignancy - no interval change.     Follow-up recommendations:  annual mammographic surveillance.  BIRADS: 2, Benign finding(s)        Electronically signed by:  Rj Calvillo MD  6/15/2020 11:41 AM CDT  Workstation: [id number]     ( I have personally reviewed the breast imaging and concur with the findings of the radiologist- BiRADS 2)  Past Medical History:   Diagnosis Date   • Abnormal findings on diagnostic imaging of breast      LEFT side BIRADS Category 4 mammogram and ultrasound      • Benign lipomatous neoplasm of other sites    • Diabetes mellitus (CMS/HCC)    • Diverticular disease of colon    • Drug therapy    • History of colon polyps    • Hx of radiation therapy    • Hyperlipidemia    • Hypertensive disorder    • Intraductal carcinoma in situ of breast     L breast   • Malignant neoplasm of breast (CMS/HCC)      T2N1M0 right side- 21 years S/P mastectomy          Past Surgical History:   Procedure Laterality Date   • BREAST BIOPSY  12/08/2014     Biopsy of the left breast with placement of a breast localization clip performed percutaneously using ultrasound guidance   • BREAST LUMPECTOMY     • BREAST SURGERY  12/18/2014    Ultrasound localization of left breast cance. Left lumpectomy   • COLONOSCOPY  07/19/2012    Mild diverticulosis (562.10) found in the sigmoid colon. A single polyp was found in the transverse colon(211.3); removed by cold biopsy polypectomy. Hemorrhoids found(455.6)   • COLONOSCOPY N/A 6/22/2017    Procedure: COLONOSCOPY;  Surgeon: Christopher Rodriguez MD;  Location: [facility] ENDOSCOPY;  Service:    • MASTECTOMY  1992    modified radical         Current Outpatient Medications:   •  amLODIPine (NORVASC) 5 MG tablet, Take 5 mg by mouth Daily., Disp: , Rfl:   •  anastrozole (ARIMIDEX) 1 MG tablet, Take 1 mg by mouth Daily., Disp: , Rfl:   •  aspirin 81 MG chewable tablet, Chew 81 mg Daily., Disp: , Rfl:   •  atorvastatin (LIPITOR) 40 MG tablet, , Disp: , Rfl:   •  lisinopril-hydrochlorothiazide (PRINZIDE,ZESTORETIC) 10-12.5 MG per tablet, Take 1 tablet by mouth Daily., Disp: , Rfl:   •  metFORMIN (GLUCOPHAGE) 500 MG tablet, Take 500 mg by mouth Every Night., Disp: , Rfl:   •  Multiple Vitamins-Minerals (MULTIVITAMIN PO), Take 1 tablet by mouth Daily., Disp: , Rfl:   •  simvastatin (ZOCOR) 20 MG tablet, Take 20 mg by mouth Every Night., Disp: , Rfl:     No Known Allergies    Family History   Problem Relation Age of Onset   • Breast cancer Sister        Social History     Socioeconomic History   • Marital status:      Spouse name: Not on file   • Number of children: Not on file   • Years of education: Not on file   • Highest education level: Not on file   Tobacco Use   • Smoking status: Never Smoker   • Smokeless tobacco: Never Used   Substance and Sexual Activity   • Alcohol use: No     Comment: Glass Of Wine At [place] And/Or New Years   • Drug use: No   • Sexual activity: Defer           Physical Exam   Pulmonary/Chest: Left breast  exhibits skin change. Left breast exhibits no inverted nipple, no mass, no nipple discharge and no tenderness.             ASSESSMENT    Yaritza was seen today for follow-up.    Diagnoses and all orders for this visit:    HX: breast cancer        PLAN    1. Recheck in 1 year with mammogram and exam              This document has been electronically signed by Nathanael Ross MD on June 22, 2020 10:34

## 2020-07-16 DIAGNOSIS — Z12.31 SCREENING MAMMOGRAM FOR HIGH-RISK PATIENT: Primary | ICD-10-CM

## 2021-06-24 DIAGNOSIS — Z12.39 SCREENING BREAST EXAMINATION: Primary | ICD-10-CM

## 2021-06-30 ENCOUNTER — OFFICE VISIT (OUTPATIENT)
Dept: SURGERY | Facility: CLINIC | Age: 76
End: 2021-06-30

## 2021-06-30 VITALS
HEART RATE: 88 BPM | TEMPERATURE: 97.3 F | WEIGHT: 131 LBS | BODY MASS INDEX: 25.72 KG/M2 | OXYGEN SATURATION: 95 % | HEIGHT: 60 IN

## 2021-06-30 DIAGNOSIS — Z12.31 SCREENING MAMMOGRAM FOR HIGH-RISK PATIENT: ICD-10-CM

## 2021-06-30 DIAGNOSIS — Z12.39 SCREENING BREAST EXAMINATION: Primary | ICD-10-CM

## 2021-06-30 DIAGNOSIS — Z85.3 HX: BREAST CANCER: Primary | ICD-10-CM

## 2021-06-30 PROCEDURE — 99212 OFFICE O/P EST SF 10 MIN: CPT | Performed by: SURGERY

## 2021-06-30 RX ORDER — CETIRIZINE HYDROCHLORIDE 10 MG/1
10 TABLET ORAL DAILY
COMMUNITY

## 2021-06-30 RX ORDER — BLOOD-GLUCOSE METER
1 KIT MISCELLANEOUS 2 TIMES DAILY
COMMUNITY
Start: 2021-04-27

## 2021-07-06 NOTE — PROGRESS NOTES
Chief Complaint   Patient presents with   • Follow-up       Breast and Mammogram         HPI  75 year old woman hx of breast cancer 29 years ago (invasive- mastectomy); and DCIS in 2006 on the left side- lumpectomy and RT. No new masses or changes.     Study Result    Study Result    Narrative & Impression      PROCEDURE: Left unilateral screening mammogram with 3-D  tomosynthesis with CAD     REASON FOR EXAM: Screening mammography. Patient with history of  right breast carcinoma with mastectomy. Patient with history of  left breast carcinoma with lumpectomy.     FINDINGS: Comparison study dated 6/15/2020, 4/8/2019, 3/30/2018,  3/29/2017, 3/22/2016, and 9/15/2015.Stable left breast post  lumpectomy changes unchanged. No suspicious mass or malignant  type microcalcifications. Stable benign left breast  calcifications.     Parenchymal pattern: Scattered fibroglandular densities     IMPRESSION:  No mammographic evidence of malignancy.     BI-RADS category 2: Benign findings.     Recommendation: Annual mammography      Electronically signed by:  Victor M Arriaga MD  6/25/2021 12:19 PM CDT  Workstation: [id number]        ( I have personally reviewed the breast imaging and concur with the findings of the radiologist- BiRADS 2)  Medical History.        Past Medical History:   Diagnosis Date   • Abnormal findings on diagnostic imaging of breast        LEFT side BIRADS Category 4 mammogram and ultrasound      • Benign lipomatous neoplasm of other sites     • Diabetes mellitus (CMS/HCC)     • Diverticular disease of colon     • Drug therapy     • History of colon polyps     • Hx of radiation therapy     • Hyperlipidemia     • Hypertensive disorder     • Intraductal carcinoma in situ of breast       L breast   • Malignant neoplasm of breast (CMS/HCC)        T2N1M0 right side- 21 years S/P mastectomy               Surgical History         Past Surgical History:   Procedure Laterality Date   • BREAST BIOPSY   12/08/2014     Biopsy  of the left breast with placement of a breast localization clip performed percutaneously using ultrasound guidance   • BREAST LUMPECTOMY       • BREAST SURGERY   12/18/2014     Ultrasound localization of left breast cance. Left lumpectomy   • COLONOSCOPY   07/19/2012     Mild diverticulosis (562.10) found in the sigmoid colon. A single polyp was found in the transverse colon(211.3); removed by cold biopsy polypectomy. Hemorrhoids found(455.6)   • COLONOSCOPY N/A 6/22/2017     Procedure: COLONOSCOPY;  Surgeon: Christopher Rodriguez MD;  Location: [facility] ENDOSCOPY;  Service:    • MASTECTOMY   1992     modified radical               Current Outpatient Medications:   •  amLODIPine (NORVASC) 5 MG tablet, Take 5 mg by mouth Daily., Disp: , Rfl:   •  anastrozole (ARIMIDEX) 1 MG tablet, Take 1 mg by mouth Daily., Disp: , Rfl:   •  aspirin 81 MG chewable tablet, Chew 81 mg Daily., Disp: , Rfl:   •  atorvastatin (LIPITOR) 40 MG tablet, , Disp: , Rfl:   •  lisinopril-hydrochlorothiazide (PRINZIDE,ZESTORETIC) 10-12.5 MG per tablet, Take 1 tablet by mouth Daily., Disp: , Rfl:   •  metFORMIN (GLUCOPHAGE) 500 MG tablet, Take 500 mg by mouth Every Night., Disp: , Rfl:   •  Multiple Vitamins-Minerals (MULTIVITAMIN PO), Take 1 tablet by mouth Daily., Disp: , Rfl:   •  simvastatin (ZOCOR) 20 MG tablet, Take 20 mg by mouth Every Night., Disp: , Rfl:      No Known Allergies           Family History   Problem Relation Age of Onset   • Breast cancer Sister           Social History   Social History            Socioeconomic History   • Marital status:        Spouse name: Not on file   • Number of children: Not on file   • Years of education: Not on file   • Highest education level: Not on file   Tobacco Use   • Smoking status: Never Smoker   • Smokeless tobacco: Never Used   Substance and Sexual Activity   • Alcohol use: No       Comment: Glass Of Wine At [place] And/Or New Years   • Drug use: No   • Sexual activity: Defer                   Physical Exam   Pulmonary/Chest: Left breast exhibits skin change. Left breast exhibits no inverted nipple, no mass, no nipple discharge and no tenderness.                ASSESSMENT     Yaritza was seen today for follow-up.     Diagnoses and all orders for this visit:     HX: breast cancer           PLAN     1. Recheck in 1 year with mammogram and exam

## 2022-05-18 ENCOUNTER — OFFICE VISIT (OUTPATIENT)
Dept: GASTROENTEROLOGY | Facility: CLINIC | Age: 77
End: 2022-05-18

## 2022-05-18 VITALS
HEIGHT: 60 IN | WEIGHT: 130 LBS | SYSTOLIC BLOOD PRESSURE: 141 MMHG | BODY MASS INDEX: 25.52 KG/M2 | HEART RATE: 87 BPM | DIASTOLIC BLOOD PRESSURE: 73 MMHG

## 2022-05-18 DIAGNOSIS — Z12.11 ENCOUNTER FOR COLONOSCOPY DUE TO HISTORY OF ADENOMATOUS COLONIC POLYPS: Primary | ICD-10-CM

## 2022-05-18 DIAGNOSIS — Z86.010 ENCOUNTER FOR COLONOSCOPY DUE TO HISTORY OF ADENOMATOUS COLONIC POLYPS: Primary | ICD-10-CM

## 2022-05-18 PROBLEM — I10 HYPERTENSIVE DISORDER: Status: ACTIVE | Noted: 2020-01-02

## 2022-05-18 PROCEDURE — S0260 H&P FOR SURGERY: HCPCS | Performed by: NURSE PRACTITIONER

## 2022-05-18 RX ORDER — TRAZODONE HYDROCHLORIDE 50 MG/1
50 TABLET ORAL NIGHTLY PRN
COMMUNITY
Start: 2022-04-27

## 2022-05-18 RX ORDER — SODIUM, POTASSIUM,MAG SULFATES 17.5-3.13G
1 SOLUTION, RECONSTITUTED, ORAL ORAL EVERY 12 HOURS
Qty: 354 ML | Refills: 0 | Status: SHIPPED | OUTPATIENT
Start: 2022-05-18 | End: 2022-06-10

## 2022-05-18 RX ORDER — DEXTROSE AND SODIUM CHLORIDE 5; .45 G/100ML; G/100ML
30 INJECTION, SOLUTION INTRAVENOUS CONTINUOUS PRN
Status: CANCELLED | OUTPATIENT
Start: 2022-06-14

## 2022-05-18 NOTE — PROGRESS NOTES
Chief Complaint   Patient presents with   • Colon Cancer Screening       Subjective    Yaritza De La O is a 76 y.o. female. she is being seen for consultation today at the request of Dr. Parada    History of Present Illness  76-year-old female presents to discuss screening colonoscopy.  She denies any abdominal pain change in bowel movements or blood within her stool.  She has personal history of colonic polyps most recent colonoscopy June 2017 noted adenomatous colonic polyp removed from cecum diverticulosis.  Repeat recommended in 5 years for surveillance.  She denies any issues with anesthesia  Plan; schedule patient for screening colonoscopy for surveillance.       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 Left lumpectomy   • COLONOSCOPY  07/19/2012    Mild diverticulosis (562.10) found in the sigmoid colon. A single polyp was found in the transverse colon(211.3); removed by cold biopsy polypectomy. Hemorrhoids found(455.6)   • COLONOSCOPY N/A 6/22/2017    Procedure: COLONOSCOPY;  Surgeon: Christopher Rodriguez MD;  Location: [facility]  ENDOSCOPY;  Service:    • MASTECTOMY      modified radical     Family History   Problem Relation Age of Onset   • Breast cancer Sister      OB History        2    Para   2    Term   2            AB        Living           SAB        IAB        Ectopic        Molar        Multiple        Live Births                  Prior to Admission medications    Medication Sig Start Date End Date Taking? Authorizing Provider   amLODIPine (NORVASC) 5 MG tablet Take 5 mg by mouth Daily.   Yes Carolin Negron MD   anastrozole (ARIMIDEX) 1 MG tablet Take 1 mg by mouth Daily.   Yes Carolin Negron MD   aspirin 81 MG chewable tablet Chew 81 mg Daily.   Yes Carolin Negron MD   atorvastatin (LIPITOR) 40 MG tablet  20  Yes Carolin Negron MD   cetirizine (zyrTEC) 10 MG tablet cetirizine 10 mg tablet   Yes Carolin Negron MD   FREESTYLE LITE test strip 1 each by Other route 2 (Two) Times a Day. to check glucose 21  Yes Carolin Negron MD   lisinopril-hydrochlorothiazide (PRINZIDE,ZESTORETIC) 10-12.5 MG per tablet Take 1 tablet by mouth Daily.   Yes Carolin Negron MD   metFORMIN (GLUCOPHAGE) 500 MG tablet Take 500 mg by mouth Every Night.   Yes Carolin Negron MD   Multiple Vitamins-Minerals (MULTIVITAMIN PO) Take 1 tablet by mouth Daily.   Yes Carolin Negron MD   simvastatin (ZOCOR) 20 MG tablet Take 20 mg by mouth Every Night.   Yes Carolin Negron MD   traZODone (DESYREL) 50 MG tablet TAKE 1 TABLET BY MOUTH ONCE DAILY AT NIGHT AS NEEDED FOR INSOMNIA 22  Yes Carolin Negron MD   sodium-potassium-magnesium sulfates (Suprep Bowel Prep Kit) 17.5-3.13-1.6 GM/177ML solution oral solution Take 1 bottle by mouth Every 12 (Twelve) Hours. 22   Talia Roblero APRN     No Known Allergies  Social History     Socioeconomic History   • Marital status:    Tobacco Use   • Smoking status: Never Smoker   • Smokeless tobacco: Never Used   Vaping  "Use   • Vaping Use: Never used   Substance and Sexual Activity   • Alcohol use: No     Comment: Glass Of Wine At Radha And/Or New Years   • Drug use: No   • Sexual activity: Defer       Review of Systems  Review of Systems   Constitutional: Negative for activity change, appetite change, chills, diaphoresis, fatigue, fever and unexpected weight change.   HENT: Negative for sore throat and trouble swallowing.    Respiratory: Negative for shortness of breath.    Gastrointestinal: Negative for abdominal distention, abdominal pain, anal bleeding, blood in stool, constipation, diarrhea, nausea, rectal pain and vomiting.   Musculoskeletal: Negative for arthralgias.   Skin: Negative for pallor.   Neurological: Negative for light-headedness.        /73 (BP Location: Left arm)   Pulse 87   Ht 152.4 cm (60\")   Wt 59 kg (130 lb)   LMP  (LMP Unknown)   BMI 25.39 kg/m²     Objective    Physical Exam  Constitutional:       General: She is not in acute distress.     Appearance: Normal appearance. She is normal weight. She is not ill-appearing.   HENT:      Head: Normocephalic and atraumatic.   Pulmonary:      Effort: Pulmonary effort is normal.   Abdominal:      General: Abdomen is flat. Bowel sounds are normal. There is no distension.      Palpations: Abdomen is soft. There is no mass.      Tenderness: There is no abdominal tenderness.   Neurological:      Mental Status: She is alert.       Admission on 06/22/2017, Discharged on 06/22/2017   Component Date Value Ref Range Status   • Glucose 06/22/2017 116  70 - 130 mg/dL Final    Meter: [id number]: [id number] HANNA SHEFFIELD   • Case Report 06/22/2017    Final                    Value:Surgical Pathology Report                         Case: [id number]                                  Authorizing Provider:  Christopher Rodriguez MD    Collected:           06/22/2017 03:40 PM          Ordering Location:     [facility]             Received:            " 06/23/2017 07:47 AM                                 [facility]                                                     Pathologist:           Carlos Ramirez MD                                                         Specimens:   1) - Large Intestine, Cecum, cecum polyp  cold bx                                                   2) - Large Intestine, Rectum, rectal polyp  hot snare                                     • Final Diagnosis 06/22/2017    Final                    Value:This result contains rich text formatting which cannot be displayed here.   • Gross Description 06/22/2017    Final                    Value:This result contains rich text formatting which cannot be displayed here.     Assessment & Plan      1. Encounter for colonoscopy due to history of adenomatous colonic polyps    .       Orders placed during this encounter include:  Orders Placed This Encounter   Procedures   • Follow Anesthesia Guidelines / Standing Orders     Standing Status:   Future   • Obtain Informed Consent     Standing Status:   Future     Order Specific Question:   Informed Consent Given For     Answer:   COLONOSCOPY       COLONOSCOPY (N/A)    Review and/or summary of lab tests, radiology, procedures, medications. Review and summary of old records and obtaining of history. The risks and benefits of my recommendations, as well as other treatment options were discussed with the patient today. Questions were answered.    New Medications Ordered This Visit   Medications   • sodium-potassium-magnesium sulfates (Suprep Bowel Prep Kit) 17.5-3.13-1.6 GM/177ML solution oral solution     Sig: Take 1 bottle by mouth Every 12 (Twelve) Hours.     Dispense:  354 mL     Refill:  0       Follow-up: Return in about 4 weeks (around 6/15/2022) for Recheck.          This document has been electronically signed by HUMAIRA Durand on May 18, 2022 11:24 CDT           I spent 18 minutes caring for Yaritza on this date of service. This time  includes time spent by me in the following activities:preparing for the visit, reviewing tests, obtaining and/or reviewing a separately obtained history, performing a medically appropriate examination and/or evaluation , counseling and educating the patient/family/caregiver, ordering medications, tests, or procedures, referring and communicating with other health care professionals , documenting information in the medical record and care coordination    Results for orders placed or performed during the hospital encounter of 06/22/17   Tissue Exam    Specimen: A: Large Intestine, Cecum; Polyp    B: Large Intestine, Rectum; Polyp   Result Value Ref Range    Case Report       Surgical Pathology Report                         Case: [id number]                                  Authorizing Provider:  Christopher Rodriguez MD    Collected:           06/22/2017 03:40 PM          Ordering Location:     [facility]             Received:            06/23/2017 07:47 AM                                 [facility]                                                     Pathologist:           Carlos Ramirez MD                                                         Specimens:   1) - Large Intestine, Cecum, cecum polyp  cold bx                                                   2) - Large Intestine, Rectum, rectal polyp  hot snare                                      Final Diagnosis       1.  TUBULAR ADENOMA, CECUM.    2.  INFLAMMATORY POLYP, RECTUM.      Gross Description       In 2 containers, each of these show mucosal biopsies measuring up to 0.3 cm in greatest dimension.  Embedded accordingly.  1A cecum polyp; 2A rectal polyp.      Embedded Images     POC Glucose Fingerstick    Specimen: Blood   Result Value Ref Range    Glucose 116 70 - 130 mg/dL   Results for orders placed or performed in visit on 10/23/16    COLONOSCOPY   Result Value Ref Range     Colonoscopy completed     MAMMOGRAPHY   Result Value Ref  Range    HM Mammogram completed    Results for orders placed or performed in visit on 07/19/12   Converted Surgical Pathology    Specimen: Tissue   Result Value Ref Range    Spec Descr 1 SPECIMEN(S): A POLYP, TRANSVERSE     Preoperative Diagnosis   PREOPERATIVE DIAGNOSIS:  None Given       Postoperative Diagnosis   POSTOPERATIVE DIAGNOSIS:  Colonic polyp       Gross Description         GROSS DESCRIPTION:  The specimen consists of a mucosal biopsy measuring up to 0.3 cm in  greatest dimension.  All embedded.      Final Diagnosis         FINAL DIAGNOSIS:  TUBULAR ADENOMA, TRANSVERSE COLON.      CONVERTED (HISTORICAL) FINAL PATHOLOGIST       Diagnostician:  JAMES GAFFNEY M.D.  Pathologist  Electronically Signed 07/23/2012      Diagnosis Code   DIAGNOSIS CODE:  8      Results for orders placed or performed in visit on 12/13/10   Converted Surgical Pathology    Specimen: Tissue   Result Value Ref Range    Spec Descr 1 SPECIMEN(S): A POLYP, TRANSVERSE     Specimen description 2 SPECIMEN(S): B POLYP, CECAL     Clinical Information   CLINICAL HISTORY:  None Given       Clinical Diagnosis   CLINICAL DIAGNOSIS:  Colonic polyps       Gross Description         GROSS DESCRIPTION:  In 2 containers, each of these show mucosal biopsies measuring up to 0.3  cm in greatest dimension.  Embedded accordingly.  (A) polyp, transverse  colon, (B) polyp, cecum.      Final Diagnosis         FINAL DIAGNOSIS:  A.  VILLOTUBULAR ADENOMA, LOW GRADE DYSPLASIA,       TRANSVERSE COLON.        B.  TUBULAR ADENOMA, LOW GRADE DYSPLASIA, CECUM.    DIAGNOSIS CODE:  8      CONVERTED (HISTORICAL) FINAL PATHOLOGIST       Diagnostician:  JAMES GAFFNEY M.D.  Pathologist  Electronically Signed 12/14/2010     Results for orders placed or performed in visit on 11/13/07   Converted Surgical Pathology    Specimen: Tissue   Result Value Ref Range    Spec Descr 1 SPECIMEN(S): A POLYP, SIGMOID     Specimen description 2 SPECIMEN(S): B POLYP, TRANSVERSE COLON   "   Specimen description 3 SPECIMEN(S): C POLYP, DESCENDING COLON     Clinical Information   CLINICAL HISTORY:  None Given       Gross Description         GROSS DESCRIPTION:  Received for examination are 3 containers, each of which have bits of  white soft tissue measuring 0.3-0.5 cc in aggregate.  All specimens are  embedded as labeled:  (A) polyp-sigmoid colon, (B) polyp-transverse  colon, (C) polyp-descending colon.      Final Diagnosis         FINAL DIAGNOSIS:  A.  POLYP, SIGMOID COLON:            TUBULAR ADENOMA.  B.  POLYP, TRANSVERSE COLON:            TUBULAR ADENOMA.  C.  POLYP, DESCENDING COLON:            HYPERPLASTIC POLYP.    DIAGNOSIS CODE:  8      Comment   CLINICAL DIAGNOSIS:  Colonic polyps       CONVERTED (HISTORICAL) FINAL PATHOLOGIST       Diagnostician:  CASSANDRA ROSAS M.D.  Pathologist  Electronically Signed 11/15/2007     Results for orders placed or performed in visit on 09/10/02   Converted Surgical Pathology    Specimen: Tissue   Result Value Ref Range    Spec Descr 1 SPECIMEN(S): A POLYP, COLON     Gross Description         GROSS DESCRIPTION:  The container is labeled \"left colon, polyp (snare biopsy)\" and has a  nodular fragment of white soft tissue 0.4 cm in diameter.  It is  entirely embedded.      Final Diagnosis         FINAL DIAGNOSIS:  MUCOSA, LEFT COLON:              TUBULAR ADENOMA.             [facility] [place]NOSIS CODE:  8      Comment   CLINICAL DIAGNOSIS:  Polyp, colon       CONVERTED (HISTORICAL) FINAL PATHOLOGIST       Diagnostician:  CASSANDRA ROSAS M.D.  Pathologist  Electronically Signed 09/11/2002         "

## 2022-06-10 RX ORDER — FERROUS SULFATE TAB EC 324 MG (65 MG FE EQUIVALENT) 324 (65 FE) MG
324 TABLET DELAYED RESPONSE ORAL
COMMUNITY

## 2022-06-10 RX ORDER — CHOLECALCIFEROL (VITAMIN D3) 125 MCG
500 CAPSULE ORAL DAILY
COMMUNITY

## 2022-06-14 ENCOUNTER — HOSPITAL ENCOUNTER (OUTPATIENT)
Facility: HOSPITAL | Age: 77
Setting detail: HOSPITAL OUTPATIENT SURGERY
Discharge: HOME | End: 2022-06-14
Attending: INTERNAL MEDICINE | Admitting: INTERNAL MEDICINE

## 2022-06-14 ENCOUNTER — ANESTHESIA (OUTPATIENT)
Dept: GASTROENTEROLOGY | Facility: HOSPITAL | Age: 77
End: 2022-06-14

## 2022-06-14 ENCOUNTER — ANESTHESIA EVENT (OUTPATIENT)
Dept: GASTROENTEROLOGY | Facility: HOSPITAL | Age: 77
End: 2022-06-14

## 2022-06-14 VITALS
WEIGHT: 125.2 LBS | RESPIRATION RATE: 18 BRPM | SYSTOLIC BLOOD PRESSURE: 99 MMHG | HEART RATE: 100 BPM | BODY MASS INDEX: 24.58 KG/M2 | HEIGHT: 60 IN | OXYGEN SATURATION: 97 % | DIASTOLIC BLOOD PRESSURE: 58 MMHG | TEMPERATURE: 97.8 F

## 2022-06-14 DIAGNOSIS — Z86.010 ENCOUNTER FOR COLONOSCOPY DUE TO HISTORY OF ADENOMATOUS COLONIC POLYPS: ICD-10-CM

## 2022-06-14 DIAGNOSIS — Z12.11 ENCOUNTER FOR COLONOSCOPY DUE TO HISTORY OF ADENOMATOUS COLONIC POLYPS: ICD-10-CM

## 2022-06-14 PROCEDURE — 45385 COLONOSCOPY W/LESION REMOVAL: CPT | Performed by: INTERNAL MEDICINE

## 2022-06-14 PROCEDURE — 25010000002 PROPOFOL 10 MG/ML EMULSION: Performed by: NURSE ANESTHETIST, CERTIFIED REGISTERED

## 2022-06-14 PROCEDURE — 88305 TISSUE EXAM BY PATHOLOGIST: CPT

## 2022-06-14 RX ORDER — DEXTROSE AND SODIUM CHLORIDE 5; .45 G/100ML; G/100ML
30 INJECTION, SOLUTION INTRAVENOUS CONTINUOUS PRN
Status: DISCONTINUED | OUTPATIENT
Start: 2022-06-14 | End: 2022-06-14 | Stop reason: HOSPADM

## 2022-06-14 RX ORDER — PROPOFOL 10 MG/ML
VIAL (ML) INTRAVENOUS AS NEEDED
Status: DISCONTINUED | OUTPATIENT
Start: 2022-06-14 | End: 2022-06-14 | Stop reason: SURG

## 2022-06-14 RX ADMIN — PROPOFOL 20 MG: 10 INJECTION, EMULSION INTRAVENOUS at 14:14

## 2022-06-14 RX ADMIN — PROPOFOL 10 MG: 10 INJECTION, EMULSION INTRAVENOUS at 14:08

## 2022-06-14 RX ADMIN — PROPOFOL 20 MG: 10 INJECTION, EMULSION INTRAVENOUS at 14:05

## 2022-06-14 RX ADMIN — DEXTROSE AND SODIUM CHLORIDE 30 ML/HR: 5; 450 INJECTION, SOLUTION INTRAVENOUS at 13:26

## 2022-06-14 RX ADMIN — PROPOFOL 20 MG: 10 INJECTION, EMULSION INTRAVENOUS at 14:04

## 2022-06-14 RX ADMIN — PROPOFOL 20 MG: 10 INJECTION, EMULSION INTRAVENOUS at 14:07

## 2022-06-14 RX ADMIN — PROPOFOL 20 MG: 10 INJECTION, EMULSION INTRAVENOUS at 14:06

## 2022-06-14 RX ADMIN — PROPOFOL 80 MG: 10 INJECTION, EMULSION INTRAVENOUS at 14:03

## 2022-06-14 RX ADMIN — PROPOFOL 20 MG: 10 INJECTION, EMULSION INTRAVENOUS at 14:10

## 2022-06-14 RX ADMIN — PROPOFOL 20 MG: 10 INJECTION, EMULSION INTRAVENOUS at 14:12

## 2022-06-14 NOTE — ANESTHESIA POSTPROCEDURE EVALUATION
Patient: Yaritza De La O    Procedure Summary     Date: 06/14/22 Room / Location: [facility] ENDOSCOPY 1 / [facility] ENDOSCOPY    Anesthesia Start: 1359 Anesthesia Stop: 1416    Procedure: COLONOSCOPY (N/A ) Diagnosis:       Encounter for colonoscopy due to history of adenomatous colonic polyps      (Encounter for colonoscopy due to history of adenomatous colonic polyps [Z12.11, Z86.010])    Surgeons: Matthew Nicole MD Provider: Philip Rivera CRNA    Anesthesia Type: MAC ASA Status: 3          Anesthesia Type: MAC    Vitals  No vitals data found for the desired time range.          Post Anesthesia Care and Evaluation    Patient location during evaluation: bedside  Patient participation: waiting for patient participation  Level of consciousness: responsive to verbal stimuli  Pain score: 0  Pain management: adequate    Airway patency: patent  Anesthetic complications: No anesthetic complications  PONV Status: none  Cardiovascular status: acceptable  Respiratory status: acceptable  Hydration status: acceptable    Comments: -------------------------              06/14/22                    1315        -------------------------   BP:         117/66        Pulse:        92         Resp:         18          Temp:   97 °F (36.1 °C)   SpO2:         95%        -------------------------

## 2022-06-14 NOTE — ANESTHESIA PREPROCEDURE EVALUATION
Anesthesia Evaluation     Patient summary reviewed and Nursing notes reviewed   NPO Solid Status: > 2 hours  NPO Liquid Status: > 2 hours           Airway   Mallampati: II  TM distance: >3 FB  Neck ROM: full  Possible difficult intubation  Dental    (+) poor dentition    Pulmonary - negative pulmonary ROS and normal exam   Cardiovascular     Rhythm: regular  Rate: normal    (+) hypertension well controlled less than 2 medications, hyperlipidemia,       Neuro/Psych- negative ROS  GI/Hepatic/Renal/Endo    (+)   diabetes mellitus type 2 well controlled,     Musculoskeletal (-) negative ROS    Abdominal  - normal exam   Substance History - negative use     OB/GYN          Other      history of cancer remission                      Anesthesia Plan    ASA 3     MAC     intravenous induction     Anesthetic plan, risks, benefits, and alternatives have been provided, discussed and informed consent has been obtained with: patient.    Plan discussed with CRNA.

## 2022-06-17 LAB — REF LAB TEST METHOD: NORMAL

## 2022-06-30 ENCOUNTER — OFFICE VISIT (OUTPATIENT)
Dept: GASTROENTEROLOGY | Facility: CLINIC | Age: 77
End: 2022-06-30

## 2022-06-30 VITALS
DIASTOLIC BLOOD PRESSURE: 70 MMHG | SYSTOLIC BLOOD PRESSURE: 116 MMHG | WEIGHT: 129 LBS | TEMPERATURE: 97 F | HEART RATE: 91 BPM | HEIGHT: 60 IN | BODY MASS INDEX: 25.32 KG/M2

## 2022-06-30 DIAGNOSIS — D12.3 ADENOMATOUS POLYP OF TRANSVERSE COLON: Primary | ICD-10-CM

## 2022-06-30 DIAGNOSIS — K57.90 DIVERTICULOSIS: ICD-10-CM

## 2022-06-30 DIAGNOSIS — D36.9 TUBULOVILLOUS ADENOMA: ICD-10-CM

## 2022-06-30 PROCEDURE — 99212 OFFICE O/P EST SF 10 MIN: CPT | Performed by: NURSE PRACTITIONER

## 2022-06-30 RX ORDER — LANCETS 33 GAUGE
EACH MISCELLANEOUS
COMMUNITY
Start: 2022-06-23

## 2022-06-30 NOTE — PROGRESS NOTES
Chief Complaint   Patient presents with   • Follow-up     Colon Polyps       Subjective    Yaritza De La O is a 76 y.o. female. she is here today for follow-up.    History of Present Illness  76-year-old female presents for follow-up after colonoscopy.  Denies any change in bowel movements or blood within her stool.  Reports bowel habits are regular denies any abdominal pain nausea or vomiting.   Colonoscopy was completed 6/14/2022 noted 2 polyps which were fully removed.  Diverticulosis was found in the sigmoid, descending transverse and ascending colon.  Polyp from transverse colon by to be adenomatous.  Cecal polyp found to be tubulovillous adenoma resection retrieval were complete.  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 Left lumpectomy   • COLONOSCOPY  07/19/2012    Mild diverticulosis (562.10) found in the sigmoid colon. A single polyp was found in the transverse colon(211.3); removed by cold biopsy polypectomy. Hemorrhoids found(455.6)   • COLONOSCOPY N/A 06/22/2017     Procedure: COLONOSCOPY;  Surgeon: Christopher Rodriguez MD;  Location: [facility] ENDOSCOPY;  Service:    • COLONOSCOPY N/A 2022    Procedure: COLONOSCOPY;  Surgeon: Matthew Nicole MD;  Location: [facility] ENDOSCOPY;  Service: Gastroenterology;  Laterality: N/A;   • HYSTERECTOMY     • MASTECTOMY Left     modified radical     Family History   Problem Relation Age of Onset   • Breast cancer Sister      OB History        2    Para   2    Term   2            AB        Living           SAB        IAB        Ectopic        Molar        Multiple        Live Births                  Prior to Admission medications    Medication Sig Start Date End Date Taking? Authorizing Provider   amLODIPine (NORVASC) 5 MG tablet Take 5 mg by mouth Daily.   Yes Carolin Negron MD   anastrozole (ARIMIDEX) 1 MG tablet Take 1 mg by mouth Daily.   Yes Carolin Negron MD   aspirin 81 MG chewable tablet Chew 81 mg Daily.   Yes Carolin Negron MD   atorvastatin (LIPITOR) 40 MG tablet Take 40 mg by mouth Every Night. 20  Yes Carolin Negron MD   cetirizine (zyrTEC) 10 MG tablet Take 10 mg by mouth Daily.   Yes Carolin Negron MD   ferrous sulfate 324 (65 Fe) MG tablet delayed-release EC tablet Take 324 mg by mouth Daily With Breakfast.   Yes Carolin Negron MD   FREESTYLE LITE test strip 1 each by Other route 2 (Two) Times a Day. to check glucose 21  Yes Carolin Negron MD   Lancets (OneTouch Delica Plus [id number]) misc  22  Yes Carolin Negron MD   lisinopril-hydrochlorothiazide (PRINZIDE,ZESTORETIC) 10-12.5 MG per tablet Take 1 tablet by mouth Daily.   Yes Carolin Negron MD   metFORMIN (GLUCOPHAGE) 500 MG tablet Take 500 mg by mouth Take As Directed. One in the AM, and two in the PM   Yes Carolin Negron MD   Multiple Vitamins-Minerals (MULTIVITAMIN PO) Take 1 tablet by mouth Daily.   Yes Carolin Negron MD   traZODone (DESYREL) 50 MG tablet Take 50  "mg by mouth At Night As Needed. 4/27/22  Yes Provider, MD Carolin   vitamin B-12 (CYANOCOBALAMIN) 500 MCG tablet Take 500 mcg by mouth Daily.   Yes Provider, MD Carolin     No Known Allergies  Social History     Socioeconomic History   • Marital status:    Tobacco Use   • Smoking status: Never Smoker   • Smokeless tobacco: Never Used   Vaping Use   • Vaping Use: Never used   Substance and Sexual Activity   • Alcohol use: No     Comment: Glass Of Wine At [place] And/Or New Years   • Drug use: No   • Sexual activity: Defer       Review of Systems  Review of Systems   Constitutional: Negative for activity change, appetite change, chills, diaphoresis, fatigue, fever and unexpected weight change.   HENT: Negative for sore throat and trouble swallowing.    Respiratory: Negative for shortness of breath.    Gastrointestinal: Negative for abdominal distention, abdominal pain, anal bleeding, blood in stool, constipation, diarrhea, nausea, rectal pain and vomiting.   Musculoskeletal: Negative for arthralgias.   Skin: Negative for pallor.   Neurological: Negative for light-headedness.        /70   Pulse 91   Temp 97 °F (36.1 °C) (Temporal)   Ht 152.4 cm (60\")   Wt 58.5 kg (129 lb)   LMP  (LMP Unknown)   BMI 25.19 kg/m²     Objective    Physical Exam  Constitutional:       General: She is not in acute distress.     Appearance: Normal appearance. She is normal weight. She is not ill-appearing.   HENT:      Head: Normocephalic and atraumatic.   Pulmonary:      Effort: Pulmonary effort is normal.   Abdominal:      General: Abdomen is flat. Bowel sounds are normal. There is no distension.      Palpations: Abdomen is soft. There is no mass.      Tenderness: There is no abdominal tenderness.   Neurological:      Mental Status: She is alert.       Admission on 06/14/2022, Discharged on 06/14/2022   Component Date Value Ref Range Status   • Reference Lab Report 06/14/2022    Final                    " "Value:[facility]  [address] [place] [address]  Phone: [phone] or [phone]  Fax: [phone]  Sherif Liz M.D., Medical Director    PATIENT NAME                           LABORATORY NO.  EMMA SOTO.                  [id number]  [id number]                         AGE              SEX  SSN           CLIENT REF #  [facility]           76      1945  F    [id number]   [id number]    [place]                       REQUESTING M.D.     ATTENDING M.D.     COPY TO.  [address] [place] [address]                 SHELLEY NEGRETE, [place] [address]             DATE COLLECTED      DATE RECEIVED      DATE REPORTED  2022          06/15/2022         2022    DIAGNOSIS:  A.   TRANSVERSE COLON POLYP:  Tubular adenoma  B.   CECUM POLYP:  Tubulovillous adenoma    JBS/sdl    CLINICAL HISTORY:  Encounter for colonoscopy due to history of adenomatous colonic polyps    SPECIMENS RECEIVED:  A.                            TRANSVERSE COLON POLYP  B.  CECUM POLYP    MICROSCOPIC DESCRIPTION:  Tissue blocks are prepared and slides are examined microscopically on all  specimens. See diagnosis for details.    Professional interpretation rendered by Zuhair Feng M.D. at [facility],  [employer], [address] [place] [address].    GROSS DESCRIPTION:  A.  Specimen is received in 1 formalin filled container \"large intestine,  transverse colon polyp\" and consists of a single piece of tan soft tissue  measuring 0.3 x 0.2 x 0.1 cm.  Specimen is submitted entirely in 1 cassette.  B.  Specimen is received in 1 formalin filled container \"large intestine, cecum  polyp\" and consists of 3 pieces of tan soft tissue measuring 0.4 x 0.3 x 0.1  cm.  Specimen is submitted entirely in 1 cassette.  MM    REVIEWED, DIAGNOSED AND ELECTRONICALLY  SIGNED BY:    Zuhair Feng M.D.  CPT CODES:  88305x2       Assessment & Plan      1. Adenomatous " polyp of transverse colon    2. Tubulovillous adenoma    3. Diverticulosis    .   Repeat colonoscopy in 3 years for surveillance of adenomatous and tubulovillous adenoma of colon.  Recommend fiber submit daily for diverticulosis follow-up in GI office sooner if needed    Orders placed during this encounter include:  No orders of the defined types were placed in this encounter.      * Surgery not found *    Review and/or summary of lab tests, radiology, procedures, medications. Review and summary of old records and obtaining of history. The risks and benefits of my recommendations, as well as other treatment options were discussed with the patient today. Questions were answered.    No orders of the defined types were placed in this encounter.      Follow-up: Return in about 3 years (around 6/30/2025) for Recheck.          This document has been electronically signed by HUMAIRA Durand on June 30, 2022 10:02 CDT           I spent 16 minutes caring for Yaritza on this date of service. This time includes time spent by me in the following activities:preparing for the visit, reviewing tests, obtaining and/or reviewing a separately obtained history, performing a medically appropriate examination and/or evaluation , counseling and educating the patient/family/caregiver, ordering medications, tests, or procedures, referring and communicating with other health care professionals , documenting information in the medical record and care coordination    Results for orders placed or performed during the hospital encounter of 06/14/22   TISSUE EXAM, [facility])    Specimen: A: Large Intestine, Transverse Colon; Polyp    B: Large Intestine, Cecum; Polyp   Result Value Ref Range    Reference Lab Report       [facility]  [address] [place] [address]  Phone: [phone] or [phone]  Fax: [phone]  Sherif Liz M.D., Medical Director    PATIENT NAME                           " LABORATORY NO.  1800  EMMA MAJANO.                  [id number]  [id number]                         AGE              SEX  [facility]           CLIENT REF #  [facility]           76      1945  F    [id number]   [id number]    [place]                       REQUESTING M.D.     ATTENDING M.D.     COPY TO.  [address] [place] [address]                 [place] [address]             DATE COLLECTED      DATE RECEIVED      DATE REPORTED  2022          06/15/2022         2022    DIAGNOSIS:  A.   TRANSVERSE COLON POLYP:  Tubular adenoma  B.   CECUM POLYP:  Tubulovillous adenoma    JBS/sdl    CLINICAL HISTORY:  Encounter for colonoscopy due to history of adenomatous colonic polyps    SPECIMENS RECEIVED:  A.   TRANSVERSE COLON POLYP  B.  CECUM POLYP    MICROSCOPIC DESCRIPTION:  Tissue blocks are prepared and slides are examined microscopically on all  specimens. See diagnosis for details.    Professional interpretation rendered by Zuhair Feng M.D. at [email or web address],  [facility], [address] [place] [address].    GROSS DESCRIPTION:  A.  Specimen is received in 1 formalin filled container \"large intestine,  transverse colon polyp\" and consists of a single piece of tan soft tissue  measuring 0.3 x 0.2 x 0.1 cm.  Specimen is submitted entirely in 1 cassette.  B.  Specimen is received in 1 formalin filled container \"large intestine, cecum  polyp\" and consists of 3 pieces of tan soft tissue measuring 0.4 x 0.3 x 0.1  cm.  Specimen is submitted entirely in 1 cassette.  MM    REVIEWED, DIAGNOSED AND ELECTRONICALLY  SIGNED BY:    Zuhair Feng M.D.  CPT CODES:  88305x2     Results for orders placed or performed during the hospital encounter of 17   Tissue Exam    Specimen: A: Large Intestine, Cecum; Polyp    B: Large Intestine, Rectum; Polyp   Result Value Ref Range    Case Report       Surgical Pathology Report                         Case: " [id number]                                  Authorizing Provider:  Christopher Rodriguez MD    Collected:           06/22/2017 03:40 PM          Ordering Location:     [facility]             Received:            06/23/2017 07:47 AM                                 [facility]                                                     Pathologist:           James Gaffney MD                                                         Specimens:   1) - Large Intestine, Cecum, cecum polyp  cold bx                                                   2) - Large Intestine, Rectum, rectal polyp  hot snare                                      Final Diagnosis       1.  TUBULAR ADENOMA, CECUM.    2.  INFLAMMATORY POLYP, RECTUM.      Gross Description       In 2 containers, each of these show mucosal biopsies measuring up to 0.3 cm in greatest dimension.  Embedded accordingly.  1A cecum polyp; 2A rectal polyp.      Embedded Images     POC Glucose Fingerstick    Specimen: Blood   Result Value Ref Range    Glucose 116 70 - 130 mg/dL   Results for orders placed or performed in visit on 10/23/16    COLONOSCOPY   Result Value Ref Range     Colonoscopy completed     MAMMOGRAPHY   Result Value Ref Range     Mammogram completed    Results for orders placed or performed in visit on 07/19/12   Converted Surgical Pathology    Specimen: Tissue   Result Value Ref Range    Spec Descr 1 SPECIMEN(S): A POLYP, TRANSVERSE     Preoperative Diagnosis   PREOPERATIVE DIAGNOSIS:  None Given       Postoperative Diagnosis   POSTOPERATIVE DIAGNOSIS:  Colonic polyp       Gross Description         GROSS DESCRIPTION:  The specimen consists of a mucosal biopsy measuring up to 0.3 cm in  greatest dimension.  All embedded.      Final Diagnosis         FINAL DIAGNOSIS:  TUBULAR ADENOMA, TRANSVERSE COLON.      CONVERTED (HISTORICAL) FINAL PATHOLOGIST       Diagnostician:  JAMES GAFFNEY M.D.  Pathologist  Electronically Signed 07/23/2012       Diagnosis Code   DIAGNOSIS CODE:  8      Results for orders placed or performed in visit on 12/13/10   Converted Surgical Pathology    Specimen: Tissue   Result Value Ref Range    Spec Descr 1 SPECIMEN(S): A POLYP, TRANSVERSE     Specimen description 2 SPECIMEN(S): B POLYP, CECAL     Clinical Information   CLINICAL HISTORY:  None Given       Clinical Diagnosis   CLINICAL DIAGNOSIS:  Colonic polyps       Gross Description         GROSS DESCRIPTION:  In 2 containers, each of these show mucosal biopsies measuring up to 0.3  cm in greatest dimension.  Embedded accordingly.  (A) polyp, transverse  colon, (B) polyp, cecum.      Final Diagnosis         FINAL DIAGNOSIS:  A.  VILLOTUBULAR ADENOMA, LOW GRADE DYSPLASIA,       TRANSVERSE COLON.        B.  TUBULAR ADENOMA, LOW GRADE DYSPLASIA, CECUM.    DIAGNOSIS CODE:  8      CONVERTED (HISTORICAL) FINAL PATHOLOGIST       Diagnostician:  JAMES GAFFNEY M.D.  Pathologist  Electronically Signed 12/14/2010     Results for orders placed or performed in visit on 11/13/07   Converted Surgical Pathology    Specimen: Tissue   Result Value Ref Range    Spec Descr 1 SPECIMEN(S): A POLYP, SIGMOID     Specimen description 2 SPECIMEN(S): B POLYP, TRANSVERSE COLON     Specimen description 3 SPECIMEN(S): C POLYP, DESCENDING COLON     Clinical Information   CLINICAL HISTORY:  None Given       Gross Description         GROSS DESCRIPTION:  Received for examination are 3 containers, each of which have bits of  white soft tissue measuring 0.3-0.5 cc in aggregate.  All specimens are  embedded as labeled:  (A) polyp-sigmoid colon, (B) polyp-transverse  colon, (C) polyp-descending colon.      Final Diagnosis         FINAL DIAGNOSIS:  A.  POLYP, SIGMOID COLON:            TUBULAR ADENOMA.  B.  POLYP, TRANSVERSE COLON:            TUBULAR ADENOMA.  C.  POLYP, DESCENDING COLON:            HYPERPLASTIC POLYP.    DIAGNOSIS CODE:  8      Comment   CLINICAL DIAGNOSIS:  Colonic polyps       CONVERTED  "(HISTORICAL) FINAL PATHOLOGIST       Diagnostician:  CASSANDRA ROSAS M.D.  Pathologist  Electronically Signed 11/15/2007     Results for orders placed or performed in visit on 09/10/02   Converted Surgical Pathology    Specimen: Tissue   Result Value Ref Range    Spec Descr 1 SPECIMEN(S): A POLYP, COLON     Gross Description         GROSS DESCRIPTION:  The container is labeled \"left colon, polyp (snare biopsy)\" and has a  nodular fragment of white soft tissue 0.4 cm in diameter.  It is  entirely embedded.      Final Diagnosis         FINAL DIAGNOSIS:  MUCOSA, LEFT COLON:              TUBULAR ADENOMA.             [facility]    DIAGNOSIS CODE:  8      Comment   CLINICAL DIAGNOSIS:  Polyp, colon       CONVERTED (HISTORICAL) FINAL PATHOLOGIST       Diagnostician:  CASSANDRA ROSAS M.D.  Pathologist  Electronically Signed 09/11/2002         "

## 2022-07-01 ENCOUNTER — OFFICE VISIT (OUTPATIENT)
Dept: SURGERY | Facility: CLINIC | Age: 77
End: 2022-07-01

## 2022-07-01 VITALS
BODY MASS INDEX: 25.48 KG/M2 | DIASTOLIC BLOOD PRESSURE: 82 MMHG | SYSTOLIC BLOOD PRESSURE: 140 MMHG | TEMPERATURE: 98.5 F | HEART RATE: 92 BPM | HEIGHT: 60 IN | WEIGHT: 129.8 LBS

## 2022-07-01 DIAGNOSIS — Z85.3 HX: BREAST CANCER: Primary | ICD-10-CM

## 2022-07-01 DIAGNOSIS — Z12.31 SCREENING MAMMOGRAM FOR HIGH-RISK PATIENT: Primary | ICD-10-CM

## 2022-07-01 PROCEDURE — 99213 OFFICE O/P EST LOW 20 MIN: CPT | Performed by: SURGERY

## 2022-07-03 NOTE — PROGRESS NOTES
Chief Complaint   Patient presents with   • Follow-up     Mammogram follow up        HPI  76-year-old woman 30 years status post invasive carcinoma of the right breast and mastectomy and history of DCIS in 2006 treated with lumpectomy and radiation therapy on the left side.  She has been doing well since last visit with no newly palpable masses, skin dimpling, nipple discharge, or axillary adenopathy.    Study Result    Narrative & Impression      PROCEDURE: Left unilateral screening mammogram with 3-D  tomosynthesis with CAD     REASON FOR EXAM: Screening mammography. Personal history of right  breast carcinoma with mastectomy. History of left breast  lumpectomy.     FINDINGS: Comparison study dated 6/24/2021, 6/15/2020, 4/8/2019,  3/30/2018, 3/29/2017, and 3/22/2016.Stable left breast post  lumpectomy changes. No interval change in appearance of the left  breast. No suspicious mass or malignant type microcalcifications  . No skin thickening or retraction. Stable benign left breast  calcifications.     Parenchymal pattern: Scattered fibroglandular densities     IMPRESSION:  No mammographic evidence of malignancy.     BI-RADS category 2: Benign findings.     Recommendation: Annual mammography      Electronically signed by:  Victor M Arriaga MD  6/28/2022 9:31 AM CDT  Workstation: [id number]     I have personally reviewed the imaging and concur with the radiologist's findings.    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 Left lumpectomy   • COLONOSCOPY  07/19/2012    Mild diverticulosis (562.10) found in the sigmoid colon. A single polyp was found in the transverse colon(211.3); removed by cold biopsy polypectomy. Hemorrhoids found(455.6)   • COLONOSCOPY N/A 06/22/2017    Procedure: COLONOSCOPY;  Surgeon: Christopher Rodirguez MD;  Location: [facility] ENDOSCOPY;  Service:    • COLONOSCOPY N/A 6/14/2022    Procedure: COLONOSCOPY;  Surgeon: Matthew Nicole MD;  Location: [facility] ENDOSCOPY;  Service: Gastroenterology;  Laterality: N/A;   • HYSTERECTOMY     • MASTECTOMY Left 1992    modified radical         Current Outpatient Medications:   •  amLODIPine (NORVASC) 5 MG tablet, Take 5 mg by mouth Daily., Disp: , Rfl:   •  anastrozole (ARIMIDEX) 1 MG tablet, Take 1 mg by mouth Daily., Disp: , Rfl:   •  aspirin 81 MG chewable tablet, Chew 81 mg Daily., Disp: , Rfl:   •  atorvastatin (LIPITOR) 40 MG tablet, Take 40 mg by mouth Every Night., Disp: , Rfl:   •  cetirizine (zyrTEC) 10 MG tablet, Take 10 mg by mouth Daily., Disp: , Rfl:   •  ferrous sulfate 324 (65 Fe) MG tablet delayed-release EC tablet, Take 324 mg by mouth Daily With Breakfast., Disp: , Rfl:   •  FREESTYLE LITE test strip, 1 each by Other route 2 (Two) Times a Day. to check glucose, Disp: , Rfl:   •  Lancets (OneTouch Delica Plus [id number]) misc, , Disp: , Rfl:   •  lisinopril-hydrochlorothiazide (PRINZIDE,ZESTORETIC) 10-12.5 MG per tablet, Take 1 tablet by mouth Daily., Disp: , Rfl:   •  metFORMIN (GLUCOPHAGE) 500 MG tablet, Take 500 mg by mouth Take As Directed. One in the AM, and two in the PM, Disp: , Rfl:   •  Multiple Vitamins-Minerals (MULTIVITAMIN PO), Take 1 tablet by  mouth Daily., Disp: , Rfl:   •  vitamin B-12 (CYANOCOBALAMIN) 500 MCG tablet, Take 500 mcg by mouth Daily., Disp: , Rfl:   •  traZODone (DESYREL) 50 MG tablet, Take 50 mg by mouth At Night As Needed., Disp: , Rfl:     No Known Allergies    Family History   Problem Relation Age of Onset   • Breast cancer Sister        Social History     Socioeconomic History   • Marital status:    Tobacco Use   • Smoking status: Never Smoker   • Smokeless tobacco: Never Used   Vaping Use   • Vaping Use: Never used   Substance and Sexual Activity   • Alcohol use: No     Comment: Glass Of Wine At [place] And/Or New Years   • Drug use: No   • Sexual activity: Defer           Physical Exam  Chest:   Breasts:      Right: Absent. No axillary adenopathy or supraclavicular adenopathy.      Left: Normal. No swelling, bleeding, inverted nipple, mass, nipple discharge, skin change, tenderness, axillary adenopathy or supraclavicular adenopathy.         Lymphadenopathy:      Upper Body:      Right upper body: No supraclavicular or axillary adenopathy.      Left upper body: No supraclavicular or axillary adenopathy.           ASSESSMENT    Diagnoses and all orders for this visit:    1. HX: breast cancer (Primary)        PLAN    1.  Recheck 1 year mammograms and exam              This document has been electronically signed by Nathanael Ross MD on July 3, 2022 13:51 CDT

## (undated) DEVICE — Device: Brand: DISPOSABLE ELECTROSURGICAL SNARE

## (undated) DEVICE — CANN SMPL SOFTECH BIFLO ETCO2 A/M 7FT

## (undated) DEVICE — TRAP SXN POLYP QUICKCATCH LF

## (undated) DEVICE — SINGLE-USE BIOPSY FORCEPS: Brand: RADIAL JAW 4